# Patient Record
Sex: FEMALE | Race: WHITE | Employment: FULL TIME | URBAN - METROPOLITAN AREA
[De-identification: names, ages, dates, MRNs, and addresses within clinical notes are randomized per-mention and may not be internally consistent; named-entity substitution may affect disease eponyms.]

---

## 2022-06-20 ENCOUNTER — HOSPITAL ENCOUNTER (EMERGENCY)
Age: 57
Discharge: HOME | End: 2022-06-20
Payer: COMMERCIAL

## 2022-06-20 ENCOUNTER — APPOINTMENT (OUTPATIENT)
Dept: GENERAL RADIOLOGY | Age: 57
End: 2022-06-20
Payer: COMMERCIAL

## 2022-06-20 VITALS
HEIGHT: 68 IN | WEIGHT: 105 LBS | HEART RATE: 78 BPM | DIASTOLIC BLOOD PRESSURE: 81 MMHG | BODY MASS INDEX: 15.91 KG/M2 | SYSTOLIC BLOOD PRESSURE: 131 MMHG | RESPIRATION RATE: 11 BRPM | OXYGEN SATURATION: 99 % | TEMPERATURE: 98.2 F

## 2022-06-20 DIAGNOSIS — S69.91XA WRIST INJURY, RIGHT, INITIAL ENCOUNTER: Primary | ICD-10-CM

## 2022-06-20 PROCEDURE — 6370000000 HC RX 637 (ALT 250 FOR IP): Performed by: PHYSICIAN ASSISTANT

## 2022-06-20 PROCEDURE — 99283 EMERGENCY DEPT VISIT LOW MDM: CPT

## 2022-06-20 PROCEDURE — 73130 X-RAY EXAM OF HAND: CPT

## 2022-06-20 PROCEDURE — 73110 X-RAY EXAM OF WRIST: CPT

## 2022-06-20 RX ORDER — LISINOPRIL 30 MG/1
30 TABLET ORAL DAILY
COMMUNITY
Start: 2022-06-08

## 2022-06-20 RX ORDER — OXYCODONE HYDROCHLORIDE 5 MG/1
5 TABLET ORAL ONCE
Status: COMPLETED | OUTPATIENT
Start: 2022-06-20 | End: 2022-06-20

## 2022-06-20 RX ORDER — TRIAMTERENE AND HYDROCHLOROTHIAZIDE 37.5; 25 MG/1; MG/1
1 TABLET ORAL DAILY
COMMUNITY

## 2022-06-20 RX ADMIN — OXYCODONE 5 MG: 5 TABLET ORAL at 09:44

## 2022-06-20 ASSESSMENT — ENCOUNTER SYMPTOMS
SHORTNESS OF BREATH: 0
BACK PAIN: 0
ABDOMINAL PAIN: 0

## 2022-06-20 ASSESSMENT — PAIN SCALES - GENERAL
PAINLEVEL_OUTOF10: 9
PAINLEVEL_OUTOF10: 5
PAINLEVEL_OUTOF10: 9

## 2022-06-20 ASSESSMENT — PAIN DESCRIPTION - LOCATION
LOCATION: WRIST

## 2022-06-20 ASSESSMENT — PAIN DESCRIPTION - DESCRIPTORS
DESCRIPTORS: ACHING;THROBBING
DESCRIPTORS: THROBBING;JABBING
DESCRIPTORS: ACHING;THROBBING

## 2022-06-20 ASSESSMENT — PAIN DESCRIPTION - ORIENTATION
ORIENTATION: RIGHT

## 2022-06-20 ASSESSMENT — PAIN DESCRIPTION - FREQUENCY: FREQUENCY: CONTINUOUS

## 2022-06-20 ASSESSMENT — PAIN - FUNCTIONAL ASSESSMENT: PAIN_FUNCTIONAL_ASSESSMENT: 0-10

## 2022-06-20 ASSESSMENT — PAIN DESCRIPTION - PAIN TYPE: TYPE: ACUTE PAIN

## 2022-06-20 NOTE — ED PROVIDER NOTES
[address] ENCOUNTER          PHYSICIAN ASSISTANT NOTE     Date of evaluation: 6/20/2022    Chief Complaint     Wrist Pain (Right wrist, possible fracture, pt tripped over rocks)    History of Present Illness     HPI: Danette Penn is a 61 y.o. female with history of Nadya-Danlos syndrome who presents to the emergency department with right wrist pain. Patient tripped over some rocks earlier today, falling on her outstretched right hand. She has pain in her right hand and wrist.  She is left-hand dominant. She did land on her buttocks, though denies any persistent pain in this area. She has some intermittent tingling to the digits of her right hand though has full ability to move them. With the exception of the above, there are no aggravating or alleviating factors. Review of Systems     Review of Systems   Constitutional: Negative for chills and fever. HENT: Negative for congestion. Respiratory: Negative for shortness of breath. Gastrointestinal: Negative for abdominal pain. Musculoskeletal: Positive for joint swelling. Negative for back pain and neck pain. Neurological: Negative for dizziness, weakness and headaches. As stated above, all other systems reviewed and are otherwise negative. Past Medical, Surgical, Family, and Social History     She has a past medical history of EDS (Nadya-Danlos syndrome) and Hypertension. She has a past surgical history that includes Uterine Suspension and Hysterectomy. Her family history is not on file. She reports that she has never smoked. She has never used smokeless tobacco. She reports previous alcohol use. She reports current drug use. Drug: Marijuana Urszula Santoyo).     Medications     Previous Medications    LISINOPRIL (PRINIVIL;ZESTRIL) 30 MG TABLET    Take 30 mg by mouth daily    TRIAMTERENE-HYDROCHLOROTHIAZIDE (MAXZIDE-25) 37.5-25 MG PER TABLET    Take 1 tablet by mouth daily       Allergies     She is allergic to bactrim [sulfamethoxazole-trimethoprim], field mint [mentha], and penicillins. Physical Exam     INITIAL VITALS: BP: 131/81, Temp: 98.2 °F (36.8 °C), Heart Rate: 78,  ,    Physical Exam  Vitals and nursing note reviewed. Constitutional:       General: She is not in acute distress. Appearance: Normal appearance. She is normal weight. She is not ill-appearing, toxic-appearing or diaphoretic. HENT:      Head: Normocephalic and atraumatic. Right Ear: External ear normal.      Left Ear: External ear normal.      Nose: Nose normal.      Mouth/Throat:      Mouth: Mucous membranes are moist.      Pharynx: Oropharynx is clear. Eyes:      Extraocular Movements: Extraocular movements intact. Conjunctiva/sclera: Conjunctivae normal.   Cardiovascular:      Rate and Rhythm: Normal rate. Pulses: Normal pulses. Pulmonary:      Effort: Pulmonary effort is normal. No respiratory distress. Abdominal:      General: Abdomen is flat. Palpations: Abdomen is soft. Musculoskeletal:      Cervical back: Normal range of motion. Comments: General tenderness over the lateral aspect of the right wrist with some minimal circumferential swelling, no obvious deformity. Tenderness outpatient along the fourth and fifth base metacarpals without any obvious overlying ecchymosis or swelling. No tenderness outpatient of right forearm or elbow. Skin:     General: Skin is warm and dry. Neurological:      General: No focal deficit present. Mental Status: She is alert. Mental status is at baseline. Comments: Sensation intact to light touch to distal aspect of right digits. Psychiatric:         Mood and Affect: Mood normal.         Behavior: Behavior normal.         Diagnostic Results     RADIOLOGY:  XR HAND RIGHT (MIN 3 VIEWS)   Final Result      1. No acute fracture or dislocation. XR WRIST RIGHT (MIN 3 VIEWS)   Final Result      1. No acute fracture or dislocation.              LABS: No results found for this visit on 06/20/22. RECENT VITALS:  BP: 131/81, Temp: 98.2 °F (36.8 °C), Heart Rate: 78,  ,       Procedures     n/a    ED Course     Nursing Notes, Past Medical Hx,Past Surgical Hx, Social Hx, Allergies, and Family Hx were reviewed. The patient was given the following medications:  Orders Placed This Encounter   Medications    oxyCODONE (ROXICODONE) immediate release tablet 5 mg       CONSULTS:  None    MEDICAL DECISION MAKING / ASSESSMENT / PLAN     Vitals:    06/20/22 0904   BP: 131/81   Pulse: 78   Temp: 98.2 °F (36.8 °C)   TempSrc: Oral   Weight: 105 lb (47.6 kg)   Height: 5' 8\" (1.727 m)       Niyah Kelly is a 61 y.o. female who presents to the emergency department with right wrist and hand pain. Patient fell onto her outstretched right hand when she tripped over some rocks earlier today. She did not hit her head or lose consciousness. She is left-hand dominant. The patient has remained hemodynamically stable throughout their stay in the emergency department, and appears well overall. Patient was given oxycodone for pain control here in the emergency department. She has a strong radial pulse and reproducible tenderness over the lateral aspect of the right wrist and base of the fourth and fifth metacarpals of the hand. X-rays will be obtained of the right wrist and hand. Fortunately x-rays of patient's wrist do not show any fractures. Patient was provided with a wrist splint. She was encouraged to try RICE therapies in addition to alternating Tylenol and ibuprofen. She was also given orthopedic contact information should her symptoms not improve after conservative therapies after 1 to 2 weeks. I do not feel that she requires formal splinting as I have low suspicion for underlying scaphoid fracture or other occult fracture given her distribution of pain and reproducible tenderness.   I do not feel the patient requires any additional imaging at this time given that she has no other focal complaints or injury sustained in the fall. She does not require syncope work-up as she had a mechanical fall and tripped. All this was discussed with the patient who is agreeable to the plan for discharge home with strict return precautions and close follow-up. Clinical Impression     1. Wrist injury, right, initial encounter        This note was dictated using voice-recognition software, which occasionally leads to inadvertent typographic errors.     Disposition     PATIENT REFERRED TO:  Israel Marti MD  [address] [place] [address]  [phone]      As needed      DISCHARGE MEDICATIONS:  New Prescriptions    No medications on file       DISPOSITION Discharge - Pending Orders Complete 06/20/2022 10:12:39 AM     PHONG Leger  06/20/22 101